# Patient Record
Sex: MALE | Race: WHITE | Employment: OTHER | URBAN - METROPOLITAN AREA
[De-identification: names, ages, dates, MRNs, and addresses within clinical notes are randomized per-mention and may not be internally consistent; named-entity substitution may affect disease eponyms.]

---

## 2019-09-09 NOTE — H&P (VIEW-ONLY)
Dalila Webb  
[birth year]  
20 y.o.  
 9/10/2019 UROLOGY ESTABLISHED PATIENT Encounter Diagnoses ICD-10-CM ICD-9-CM 1. Kidney stone N20.0 592.0 2. Hematuria, unspecified type R31.9 599.70 ASSESSMENT:  
1. Kidney stones 3 mm left ureteral stone at [address] and a column of tiny calcifications at left UVJ argest measuring 3 mm resulting in left hydronephrosis per CT 5/4/18. CT A/P WO Cont 7/21/19: small mid right ureteral calculus and a left proximal ureteral calculus with moderate bilateral hydroureteronephrosis. multiple large left renal pelvis calculi, largest 16 mm S/p cysto, bilateral RPG, and bilateral ureteral stent placement on 7/22/2019 S/p cysto, right ureteroscopy, right RPG, right ureteral stent removal, and fulguration of prostate bleeding on 9/10/19 Plan for left PCNL 2. H/o elevated PSA which has been as high as 6.1 on 10/19/15. Likely secondary to TRT. No longer on TRT and most recent PSA of 3.6 in 4/2019 
 
3. Hx of hypogonadism. T level 406 on 8/23/19 PLAN:  
· Urine sent for culture, will treat accordingly · Previously drawn PTH lab was cancelled, will get PTH in house after surgery · Now that right sided stone has been cleared, recommend left PCNL. · Reviewed indication, risks, and benefits of a PCNL to treat left sided stone burden. Hold ASA, fish oil, and other blood thinners 1 week prior. Surgery letter sent · RX for Ditropan 5mg provided to take PRN for bladder spasms. Reviewed R/B. Discussed urinary symptoms are likely secondary to JJ stent · To OR for left PCNL, surgery letter sent, plan on surgery 9/24. Discussion: 
We discussed the options for management of kidney/ureteral stones, including observation and PCNL. The risks and benefits of each option were discussed with patient.    
 
PCNL: risks and benefits of PCNL were outlined including infection, bleeding, blood transfusion, pain, pneumothorax, bowel injury, persistent urine leak, need for ancillary treatments, and global anesthesia risks including but not limited to CVA, MI, DVT, PE, pneumonia, and death. Chief Complaint Patient presents with  Kidney Stone  Post OP Follow Up HISTORY OF PRESENT ILLNESS:  Dalila Webb is a 69 y.o. male who is seen as a hospital follow up for kidney stones. Patient recently developed abdominal pain in 7/2019 and he presented to the [address] [place] [address]. A CT at that time revealed bilateral ureteral stones with bilateral moderate hydronephrosis and a significant left renal stone burden. Patient was transferred to [facility] for treatment. Pre-stent placement creatinine 2.2. Creatinine improved to 1.7 after the stents were placed. Patient then had a cysto, right ureteroscopy, right RPG, right ureteral stent removal, and fulguration of prostate bleeding on 9/10/19. No stones were identified or treated at that time as patient and patient likely passed the previously seen small mid right ureteral calculus. Today, patient is accompanied by his wife. Denies any gross hematuria since the procedure on 9/10/19. Patient reports he has been having significant suprapubic/bladder pain and urinary urgency/frequency since removing his catheter. He has been having trouble with sleeping 2/2 the pain and also reports some lower back pain. Prior history:  
Patient has a hx of ED and hypogonadism and is interested in treatment for both. T level 406 on 8/23/19. Pt was previously seen by Dr. Laney Hurley in 2015 for h/o elevated PSA which has been as high as 6.1 on 10/19/15. Most recent PSA of 3.6 in 4/2019. PSA elevation in the past likely due to being on TRT. He has been off TRT for several years. Past Medical History:  
Diagnosis Date  Chronic prostatitis  Kidney stone  Mixed hyperlipidemia  Nonspecific elevation of levels of transaminase or lactic acid dehydrogenase (LDH)  Polycythemia, secondary Past Surgical History:  
Procedure Laterality Date  HX OTHER SURGICAL  1998  
 right rotator cuff surgery  HX UROLOGICAL  08/28/2019 Cystoscopy, Right diagnostic ureteroscopy, R RPG, R ureteral stent removal, Fulguration of prostate bleeding. YULIA Teran [address]  
 right hydrocelectomy Social History Tobacco Use  Smoking status: Never Smoker  Smokeless tobacco: Never Used Substance Use Topics  Alcohol use: Yes Alcohol/week: 2.5 standard drinks Types: 3 Standard drinks or equivalent per week  Drug use: No  
 
 
No Known Allergies Family History Problem Relation Age of Onset  Hypertension Father  Stroke Father  Stroke Mother Current Outpatient Medications Medication Sig Dispense Refill  oxybutynin (DITROPAN) 5 mg tablet Take 1 Tab by mouth three (3) times daily as needed (bladder spasms). 40 Tab 0  
 atenolol (TENORMIN) 25 mg tablet   5  
 aspirin 81 mg chewable tablet Take 81 mg by mouth.  ascorbate calcium, vitamin C, 500 mg tab Take 1,000 mg by mouth.  cholecalciferol (VITAMIN D3) 1,000 unit tablet Take 2,000 Units by mouth.  fenofibrate nanocrystallized (TRICOR) 145 mg tablet Take  by mouth.  lisinopril-hydroCHLOROthiazide (PRINZIDE, ZESTORETIC) 20-12.5 mg per tablet Take  by mouth daily.  omega-3 acid ethyl esters (LOVAZA) 1 gram capsule Take 2 g by mouth two (2) times a day.  sildenafil citrate (VIAGRA) 100 mg tablet Take 100 mg by mouth as needed.  niacin ER (NIASPAN) 500 mg tablet Take  by mouth nightly.  rosuvastatin (CRESTOR) 10 mg tablet Take 10 mg by mouth nightly. Review of Systems Constitutional: Fever: No 
Skin: Rash: No 
HEENT: Hearing difficulty: No 
Eyes: Blurred vision: No 
Cardiovascular: Chest pain: No 
Respiratory: Shortness of breath: No 
Gastrointestinal: Nausea/vomiting: No 
Musculoskeletal: Back pain: No 
 Neurological: Weakness: No 
Psychological: Memory loss: No 
Comments/additional findings: PHYSICAL EXAMINATION:  
Visit Vitals /82 Ht 5' 8.5\" (1.74 m) Wt 173 lb (78.5 kg) BMI 25.92 kg/m² Constitutional: WDWN, No acute distress. HEENT: EOMs in tact Respiratory: No respiratory distress or difficulties CV:  No peripheral swelling noted Skin: Normal color and texture and No rashes or erythema noted Neuro/Psych:  No focal deficits EXT: no cyanosis or edema REVIEW OF LABS AND IMAGING:   
Results for orders placed or performed in visit on 09/10/19 AMB POC URINALYSIS DIP STICK AUTO W/O MICRO Result Value Ref Range Color (UA POC) Yellow Clarity (UA POC) Clear Glucose (UA POC) Negative Negative Bilirubin (UA POC) Negative Negative Ketones (UA POC) Negative Negative Specific gravity (UA POC) 1.020 1.001 - 1.035 Blood (UA POC) 2+ Negative pH (UA POC) 5.0 4.6 - 8.0 Protein (UA POC) 1+ Negative Urobilinogen (UA POC) 0.2 mg/dL 0.2 - 1 Nitrites (UA POC) Negative Negative Leukocyte esterase (UA POC) 1+ Negative PSA Trend PSA /TESTOSTERONE - [facility] PSA Latest Ref Rng & Units 0.0 - 3.6 ng/mL 4/1/2019 3.6  
9/27/2018 4.0  
4/25/2018 4.8  
10/12/2017 2.0  
4/20/2017 3.0  
10/18/2016 3.3  
4/1/2016 4.9 (A)  
10/19/2015 6.1 (A)  
4/3/2015 4.1 (A)  
4/17/2014 3.2 Testosterone PSA /TESTOSTERONE - [facility] Testosterone, total  
Latest Ref Rng & Units 348 - 1197 ng/dL  
8/23/2019 406  
4/20/2017 420 Radiology:  
 
CT A/P WO Cont 7/21/2019 Lung bases are clear. No pleural or pericardial fluid. Coronary atherosclerosis. 4 mm calculus right mid ureter with mild to moderate dilatation right proximal ureter and intrarenal collecting system. 17 mm calculus left proximal ureter, with multiple large left renal pelvis calculi, largest 16 mm. Additional smaller calculi left lower pole calyx.  Moderate left renal collecting system dilatation. The liver, gallbladder, spleen, pancreas, adrenals are all within normal limits are noncontrast exam. Subtle solid parenchymal lesions may be missed without IV contrast administration. Hollow viscus are normal. No free air or free fluid. Review of bone windows is unremarkable. Multilevel moderate disc space loss throughout lumbar spine, 2 mm L1 upon L2, L2 upon L3 retrolisthesis, and L4 upon L5 anterior listhesis. No definitive pars defect. Impression: 1. Small mid right ureteral calculus with moderate right hydroureteronephrosis. 2. Left proximal ureteral calculus with moderate left hydroureteronephrosis. Additional multiple prominent left renal calculi. Shant Gonzáles MD 
, Dept of Urology [employer] [facility] [place] [place] Pager #: [phone] Patient's BMI is out of the normal parameters. Information about BMI was given and patient was advised to follow-up with their PCP for further management. Medical documentation entered into the chart by kaveh Esparza scribe for Kenzie Polk MD on 9/10/2019.

## 2019-09-18 ENCOUNTER — HOSPITAL ENCOUNTER (OUTPATIENT)
Age: 74
Setting detail: OUTPATIENT SURGERY
DRG: 661 | End: 2019-09-18
Attending: UROLOGY | Admitting: UROLOGY
Payer: MEDICARE

## 2019-10-03 RX ORDER — CEFAZOLIN SODIUM 2 G/50ML
2 SOLUTION INTRAVENOUS
Status: CANCELLED | OUTPATIENT
Start: 2019-10-08 | End: 2019-10-09

## 2019-10-04 VITALS — BODY MASS INDEX: 26.96 KG/M2 | HEIGHT: 69 IN | WEIGHT: 182 LBS

## 2019-10-04 RX ORDER — ACETAMINOPHEN 500 MG
500 TABLET ORAL
COMMUNITY

## 2019-10-04 NOTE — PERIOP NOTES
PAT - SURGICAL PRE-ADMISSION INSTRUCTIONS    NAME:  Gail Aguirre                                                          TODAY'S DATE:  10/4/2019    SURGERY DATE:  10/8/2019                                  SURGERY ARRIVAL TIME:   TBV    1. Do NOT eat or drink anything, including candy or gum, after MIDNIGHT on 10/07/2019 , unless you have specific instructions from your Surgeon or Anesthesia Provider to do so. 2. No smoking on the day of surgery. 3. No alcohol 24 hours prior to the day of surgery. 4. No recreational drugs for one week prior to the day of surgery. 5. Leave all valuables, including money/purse, at home. 6. Remove all jewelry, nail polish, makeup (including mascara); no lotions, powders, deodorant, or perfume/cologne/after shave. 7. Glasses/Contact lenses and Dentures may be worn to the hospital.  They will be removed prior to surgery. 8. Call your doctor if symptoms of a cold or illness develop within 24 ours prior to surgery. 9. AN ADULT MUST DRIVE YOU HOME AFTER OUTPATIENT SURGERY. 10. If you are having an OUTPATIENT procedure, please make arrangements for a responsible adult to be with you for 24 hours after your surgery. 11. If you are admitted to the hospital, you will be assigned to a bed after surgery is complete. Normally a family member will not be able to see you until you are in your assigned bed. 15. Family is encouraged to accompany you to the hospital.  We ask visitors in the treatment area to be limited to ONE person at a time to ensure patient privacy. EXCEPTIONS WILL BE MADE AS NEEDED. 15. Children under 12 are discouraged from entering the treatment area and need to be supervised by an adult when in the waiting room.     Special Instructions:    STOP anticoagulants AT LEAST 1 WEEK PRIOR to your surgery or, follow other MD instructions:  Stopped Nsaids on Sunday    Patient Prep:    shower with anti-bacterial soap    These surgical instructions were reviewed with Krystina Garcia Saqib Pandya during the PAT phone call. Directions: On the morning of surgery, please go to the [facility]. Enter the building from the [facility] entrance, 1st floor (next to the Emergency Room entrance). Take the elevator to the 2nd floor. Sign in at the Registration Desk.     If you have any questions and/or concerns, please do not hesitate to call:  (Prior to the day of surgery)  [facility] [place] unit:  [phone]  (Day of surgery)  [facility] unit:  [phone]

## 2019-10-07 ENCOUNTER — ANESTHESIA EVENT (OUTPATIENT)
Dept: SURGERY | Age: 74
DRG: 661 | End: 2019-10-07
Payer: MEDICARE

## 2019-10-07 RX ORDER — FAMOTIDINE 20 MG/1
20 TABLET, FILM COATED ORAL ONCE
Status: CANCELLED | OUTPATIENT
Start: 2019-10-08 | End: 2019-10-08

## 2019-10-07 RX ORDER — SODIUM CHLORIDE, SODIUM LACTATE, POTASSIUM CHLORIDE, CALCIUM CHLORIDE 600; 310; 30; 20 MG/100ML; MG/100ML; MG/100ML; MG/100ML
50 INJECTION, SOLUTION INTRAVENOUS CONTINUOUS
Status: CANCELLED | OUTPATIENT
Start: 2019-10-07 | End: 2019-10-08

## 2019-10-07 RX ORDER — LIDOCAINE HYDROCHLORIDE 10 MG/ML
0.1 INJECTION, SOLUTION EPIDURAL; INFILTRATION; INTRACAUDAL; PERINEURAL AS NEEDED
Status: CANCELLED | OUTPATIENT
Start: 2019-10-07

## 2019-10-08 ENCOUNTER — HOSPITAL ENCOUNTER (INPATIENT)
Dept: CARDIAC CATH/INVASIVE PROCEDURES | Age: 74
LOS: 1 days | Discharge: HOME | DRG: 661 | End: 2019-10-09
Attending: UROLOGY | Admitting: UROLOGY
Payer: MEDICARE

## 2019-10-08 ENCOUNTER — ANESTHESIA (OUTPATIENT)
Dept: SURGERY | Age: 74
DRG: 661 | End: 2019-10-08
Payer: MEDICARE

## 2019-10-08 ENCOUNTER — APPOINTMENT (OUTPATIENT)
Dept: GENERAL RADIOLOGY | Age: 74
DRG: 661 | End: 2019-10-08
Attending: UROLOGY
Payer: MEDICARE

## 2019-10-08 DIAGNOSIS — N20.0 KIDNEY STONE: ICD-10-CM

## 2019-10-08 LAB
ANION GAP SERPL CALC-SCNC: 6 MMOL/L (ref 3–18)
APTT PPP: 28.1 SEC (ref 23–36.4)
BUN SERPL-MCNC: 26 MG/DL (ref 7–18)
BUN/CREAT SERPL: 18 (ref 12–20)
CALCIUM SERPL-MCNC: 9.8 MG/DL (ref 8.5–10.1)
CHLORIDE SERPL-SCNC: 107 MMOL/L (ref 100–111)
CO2 SERPL-SCNC: 26 MMOL/L (ref 21–32)
CREAT SERPL-MCNC: 1.42 MG/DL (ref 0.6–1.3)
ERYTHROCYTE [DISTWIDTH] IN BLOOD BY AUTOMATED COUNT: 15.5 % (ref 11.6–14.5)
GLUCOSE SERPL-MCNC: 90 MG/DL (ref 74–99)
HCT VFR BLD AUTO: 40.7 % (ref 36–48)
HGB BLD-MCNC: 13.6 G/DL (ref 13–16)
INR PPP: 1 (ref 0.8–1.2)
MCH RBC QN AUTO: 31.5 PG (ref 24–34)
MCHC RBC AUTO-ENTMCNC: 33.4 G/DL (ref 31–37)
MCV RBC AUTO: 94.2 FL (ref 74–97)
PLATELET # BLD AUTO: 258 K/UL (ref 135–420)
PMV BLD AUTO: 9.9 FL (ref 9.2–11.8)
POTASSIUM SERPL-SCNC: 3.9 MMOL/L (ref 3.5–5.5)
PROTHROMBIN TIME: 13 SEC (ref 11.5–15.2)
RBC # BLD AUTO: 4.32 M/UL (ref 4.7–5.5)
SODIUM SERPL-SCNC: 139 MMOL/L (ref 136–145)
WBC # BLD AUTO: 8.2 K/UL (ref 4.6–13.2)

## 2019-10-08 PROCEDURE — 74011000250 HC RX REV CODE- 250

## 2019-10-08 PROCEDURE — 74011250636 HC RX REV CODE- 250/636: Performed by: UROLOGY

## 2019-10-08 PROCEDURE — C1758 CATHETER, URETERAL: HCPCS | Performed by: UROLOGY

## 2019-10-08 PROCEDURE — 74011250636 HC RX REV CODE- 250/636

## 2019-10-08 PROCEDURE — C1769 GUIDE WIRE: HCPCS | Performed by: UROLOGY

## 2019-10-08 PROCEDURE — 0T9130Z DRAINAGE OF LEFT KIDNEY WITH DRAINAGE DEVICE, PERCUTANEOUS APPROACH: ICD-10-PCS | Performed by: UROLOGY

## 2019-10-08 PROCEDURE — 74011636320 HC RX REV CODE- 636/320: Performed by: UROLOGY

## 2019-10-08 PROCEDURE — 77030003666 HC NDL SPINAL BD -A

## 2019-10-08 PROCEDURE — 77030034850: Performed by: UROLOGY

## 2019-10-08 PROCEDURE — 85730 THROMBOPLASTIN TIME PARTIAL: CPT

## 2019-10-08 PROCEDURE — 76060000035 HC ANESTHESIA 2 TO 2.5 HR: Performed by: UROLOGY

## 2019-10-08 PROCEDURE — 77030032490 HC SLV COMPR SCD KNE COVD -B: Performed by: UROLOGY

## 2019-10-08 PROCEDURE — 77030026962 HC PRB RNL CYBERWND OCOA -F: Performed by: UROLOGY

## 2019-10-08 PROCEDURE — C1769 GUIDE WIRE: HCPCS

## 2019-10-08 PROCEDURE — 77030010545: Performed by: UROLOGY

## 2019-10-08 PROCEDURE — C1887 CATHETER, GUIDING: HCPCS

## 2019-10-08 PROCEDURE — BT121ZZ FLUOROSCOPY OF LEFT KIDNEY USING LOW OSMOLAR CONTRAST: ICD-10-PCS | Performed by: RADIOLOGY

## 2019-10-08 PROCEDURE — 77030018836 HC SOL IRR NACL ICUM -A: Performed by: UROLOGY

## 2019-10-08 PROCEDURE — 76210000006 HC OR PH I REC 0.5 TO 1 HR: Performed by: UROLOGY

## 2019-10-08 PROCEDURE — 74011000250 HC RX REV CODE- 250: Performed by: RADIOLOGY

## 2019-10-08 PROCEDURE — 82360 CALCULUS ASSAY QUANT: CPT

## 2019-10-08 PROCEDURE — 77030006974 HC BSKT URET RTVR BSC -C: Performed by: UROLOGY

## 2019-10-08 PROCEDURE — 77030005518 HC CATH URETH FOL 2W BARD -B: Performed by: UROLOGY

## 2019-10-08 PROCEDURE — 76010000171 HC OR TIME 2 TO 2.5 HR INTENSV-TIER 1: Performed by: UROLOGY

## 2019-10-08 PROCEDURE — 50390 DRAINAGE OF KIDNEY LESION: CPT

## 2019-10-08 PROCEDURE — 0TP98DZ REMOVAL OF INTRALUMINAL DEVICE FROM URETER, VIA NATURAL OR ARTIFICIAL OPENING ENDOSCOPIC: ICD-10-PCS | Performed by: UROLOGY

## 2019-10-08 PROCEDURE — 85610 PROTHROMBIN TIME: CPT

## 2019-10-08 PROCEDURE — 74011250636 HC RX REV CODE- 250/636: Performed by: STUDENT IN AN ORGANIZED HEALTH CARE EDUCATION/TRAINING PROGRAM

## 2019-10-08 PROCEDURE — 50433 PLMT NEPHROURETERAL CATHETER: CPT

## 2019-10-08 PROCEDURE — 74011000258 HC RX REV CODE- 258: Performed by: RADIOLOGY

## 2019-10-08 PROCEDURE — 0T9100Z DRAINAGE OF LEFT KIDNEY WITH DRAINAGE DEVICE, OPEN APPROACH: ICD-10-PCS | Performed by: UROLOGY

## 2019-10-08 PROCEDURE — 74011250637 HC RX REV CODE- 250/637: Performed by: STUDENT IN AN ORGANIZED HEALTH CARE EDUCATION/TRAINING PROGRAM

## 2019-10-08 PROCEDURE — 76010000131 HC OR TIME 2 TO 2.5 HR: Performed by: UROLOGY

## 2019-10-08 PROCEDURE — 74011250636 HC RX REV CODE- 250/636: Performed by: RADIOLOGY

## 2019-10-08 PROCEDURE — 80048 BASIC METABOLIC PNL TOTAL CA: CPT

## 2019-10-08 PROCEDURE — 77030013079 HC BLNKT BAIR HGGR 3M -A: Performed by: NURSE ANESTHETIST, CERTIFIED REGISTERED

## 2019-10-08 PROCEDURE — 77030008683 HC TU ET CUF COVD -A: Performed by: NURSE ANESTHETIST, CERTIFIED REGISTERED

## 2019-10-08 PROCEDURE — 0TC18ZZ EXTIRPATION OF MATTER FROM LEFT KIDNEY, VIA NATURAL OR ARTIFICIAL OPENING ENDOSCOPIC: ICD-10-PCS | Performed by: UROLOGY

## 2019-10-08 PROCEDURE — C1887 CATHETER, GUIDING: HCPCS | Performed by: UROLOGY

## 2019-10-08 PROCEDURE — 74011250637 HC RX REV CODE- 250/637: Performed by: NURSE ANESTHETIST, CERTIFIED REGISTERED

## 2019-10-08 PROCEDURE — 77030008477 HC STYL SATN SLP COVD -A: Performed by: NURSE ANESTHETIST, CERTIFIED REGISTERED

## 2019-10-08 PROCEDURE — C1894 INTRO/SHEATH, NON-LASER: HCPCS | Performed by: UROLOGY

## 2019-10-08 PROCEDURE — 99152 MOD SED SAME PHYS/QHP 5/>YRS: CPT

## 2019-10-08 PROCEDURE — C1726 CATH, BAL DIL, NON-VASCULAR: HCPCS | Performed by: UROLOGY

## 2019-10-08 PROCEDURE — 0TC13ZZ EXTIRPATION OF MATTER FROM LEFT KIDNEY, PERCUTANEOUS APPROACH: ICD-10-PCS | Performed by: UROLOGY

## 2019-10-08 PROCEDURE — 85027 COMPLETE CBC AUTOMATED: CPT

## 2019-10-08 PROCEDURE — 74011250636 HC RX REV CODE- 250/636: Performed by: NURSE ANESTHETIST, CERTIFIED REGISTERED

## 2019-10-08 PROCEDURE — 74011636320 HC RX REV CODE- 636/320: Performed by: RADIOLOGY

## 2019-10-08 PROCEDURE — 65270000029 HC RM PRIVATE

## 2019-10-08 RX ORDER — HYDROCHLOROTHIAZIDE 25 MG/1
12.5 TABLET ORAL DAILY
Status: DISCONTINUED | OUTPATIENT
Start: 2019-10-09 | End: 2019-10-09 | Stop reason: HOSPADM

## 2019-10-08 RX ORDER — FAMOTIDINE 20 MG/1
20 TABLET, FILM COATED ORAL ONCE
Status: COMPLETED | OUTPATIENT
Start: 2019-10-08 | End: 2019-10-08

## 2019-10-08 RX ORDER — ROSUVASTATIN CALCIUM 10 MG/1
10 TABLET, COATED ORAL
Status: DISCONTINUED | OUTPATIENT
Start: 2019-10-08 | End: 2019-10-09 | Stop reason: HOSPADM

## 2019-10-08 RX ORDER — SODIUM CHLORIDE 0.9 % (FLUSH) 0.9 %
5-40 SYRINGE (ML) INJECTION EVERY 8 HOURS
Status: DISCONTINUED | OUTPATIENT
Start: 2019-10-08 | End: 2019-10-08

## 2019-10-08 RX ORDER — HYDROMORPHONE HYDROCHLORIDE 2 MG/ML
0.5 INJECTION, SOLUTION INTRAMUSCULAR; INTRAVENOUS; SUBCUTANEOUS
Status: DISCONTINUED | OUTPATIENT
Start: 2019-10-08 | End: 2019-10-08 | Stop reason: HOSPADM

## 2019-10-08 RX ORDER — PROPOFOL 10 MG/ML
INJECTION, EMULSION INTRAVENOUS AS NEEDED
Status: DISCONTINUED | OUTPATIENT
Start: 2019-10-08 | End: 2019-10-08 | Stop reason: HOSPADM

## 2019-10-08 RX ORDER — SODIUM CHLORIDE, SODIUM LACTATE, POTASSIUM CHLORIDE, CALCIUM CHLORIDE 600; 310; 30; 20 MG/100ML; MG/100ML; MG/100ML; MG/100ML
50 INJECTION, SOLUTION INTRAVENOUS CONTINUOUS
Status: DISCONTINUED | OUTPATIENT
Start: 2019-10-08 | End: 2019-10-08 | Stop reason: HOSPADM

## 2019-10-08 RX ORDER — NEOSTIGMINE METHYLSULFATE 1 MG/ML
INJECTION, SOLUTION INTRAVENOUS AS NEEDED
Status: DISCONTINUED | OUTPATIENT
Start: 2019-10-08 | End: 2019-10-08 | Stop reason: HOSPADM

## 2019-10-08 RX ORDER — MORPHINE SULFATE 2 MG/ML
2 INJECTION, SOLUTION INTRAMUSCULAR; INTRAVENOUS
Status: DISCONTINUED | OUTPATIENT
Start: 2019-10-08 | End: 2019-10-09 | Stop reason: HOSPADM

## 2019-10-08 RX ORDER — GENTAMICIN SULFATE 40 MG/ML
INJECTION, SOLUTION INTRAMUSCULAR; INTRAVENOUS AS NEEDED
Status: DISCONTINUED | OUTPATIENT
Start: 2019-10-08 | End: 2019-10-08 | Stop reason: HOSPADM

## 2019-10-08 RX ORDER — ONDANSETRON 2 MG/ML
4 INJECTION INTRAMUSCULAR; INTRAVENOUS EVERY 6 HOURS
Status: DISCONTINUED | OUTPATIENT
Start: 2019-10-08 | End: 2019-10-09 | Stop reason: HOSPADM

## 2019-10-08 RX ORDER — DOCUSATE SODIUM 100 MG/1
100 CAPSULE, LIQUID FILLED ORAL 2 TIMES DAILY
Qty: 20 CAP | Refills: 0 | Status: SHIPPED | OUTPATIENT
Start: 2019-10-08 | End: 2019-10-18

## 2019-10-08 RX ORDER — LIDOCAINE HYDROCHLORIDE 10 MG/ML
1-30 INJECTION INFILTRATION; PERINEURAL AS NEEDED
Status: DISCONTINUED | OUTPATIENT
Start: 2019-10-08 | End: 2019-10-08 | Stop reason: HOSPADM

## 2019-10-08 RX ORDER — ONDANSETRON 2 MG/ML
4 INJECTION INTRAMUSCULAR; INTRAVENOUS ONCE
Status: DISCONTINUED | OUTPATIENT
Start: 2019-10-08 | End: 2019-10-08 | Stop reason: HOSPADM

## 2019-10-08 RX ORDER — SODIUM CHLORIDE 0.9 % (FLUSH) 0.9 %
5-40 SYRINGE (ML) INJECTION AS NEEDED
Status: DISCONTINUED | OUTPATIENT
Start: 2019-10-08 | End: 2019-10-08

## 2019-10-08 RX ORDER — HEPARIN SODIUM 5000 [USP'U]/ML
5000 INJECTION, SOLUTION INTRAVENOUS; SUBCUTANEOUS EVERY 8 HOURS
Status: DISCONTINUED | OUTPATIENT
Start: 2019-10-08 | End: 2019-10-08

## 2019-10-08 RX ORDER — SODIUM CHLORIDE 0.9 % (FLUSH) 0.9 %
5-40 SYRINGE (ML) INJECTION EVERY 8 HOURS
Status: DISCONTINUED | OUTPATIENT
Start: 2019-10-08 | End: 2019-10-08 | Stop reason: HOSPADM

## 2019-10-08 RX ORDER — NALOXONE HYDROCHLORIDE 0.4 MG/ML
0.4 INJECTION, SOLUTION INTRAMUSCULAR; INTRAVENOUS; SUBCUTANEOUS AS NEEDED
Status: DISCONTINUED | OUTPATIENT
Start: 2019-10-08 | End: 2019-10-09 | Stop reason: HOSPADM

## 2019-10-08 RX ORDER — LISINOPRIL 20 MG/1
20 TABLET ORAL DAILY
Status: DISCONTINUED | OUTPATIENT
Start: 2019-10-09 | End: 2019-10-09 | Stop reason: HOSPADM

## 2019-10-08 RX ORDER — OXYBUTYNIN CHLORIDE 5 MG/1
5 TABLET ORAL
Status: DISCONTINUED | OUTPATIENT
Start: 2019-10-08 | End: 2019-10-09 | Stop reason: HOSPADM

## 2019-10-08 RX ORDER — FENTANYL CITRATE 50 UG/ML
50 INJECTION, SOLUTION INTRAMUSCULAR; INTRAVENOUS AS NEEDED
Status: DISCONTINUED | OUTPATIENT
Start: 2019-10-08 | End: 2019-10-08 | Stop reason: HOSPADM

## 2019-10-08 RX ORDER — ONDANSETRON HYDROCHLORIDE 4 MG/2ML
INJECTION, SOLUTION INTRAMUSCULAR; INTRAVENOUS AS NEEDED
Status: DISCONTINUED | OUTPATIENT
Start: 2019-10-08 | End: 2019-10-08 | Stop reason: HOSPADM

## 2019-10-08 RX ORDER — FENTANYL CITRATE 50 UG/ML
INJECTION, SOLUTION INTRAMUSCULAR; INTRAVENOUS AS NEEDED
Status: DISCONTINUED | OUTPATIENT
Start: 2019-10-08 | End: 2019-10-08 | Stop reason: HOSPADM

## 2019-10-08 RX ORDER — CEFAZOLIN SODIUM 2 G/50ML
2 SOLUTION INTRAVENOUS EVERY 8 HOURS
Status: COMPLETED | OUTPATIENT
Start: 2019-10-08 | End: 2019-10-09

## 2019-10-08 RX ORDER — LIDOCAINE HYDROCHLORIDE 10 MG/ML
0.1 INJECTION, SOLUTION EPIDURAL; INFILTRATION; INTRACAUDAL; PERINEURAL AS NEEDED
Status: DISCONTINUED | OUTPATIENT
Start: 2019-10-08 | End: 2019-10-08 | Stop reason: HOSPADM

## 2019-10-08 RX ORDER — DEXAMETHASONE SODIUM PHOSPHATE 4 MG/ML
INJECTION, SOLUTION INTRA-ARTICULAR; INTRALESIONAL; INTRAMUSCULAR; INTRAVENOUS; SOFT TISSUE AS NEEDED
Status: DISCONTINUED | OUTPATIENT
Start: 2019-10-08 | End: 2019-10-08 | Stop reason: HOSPADM

## 2019-10-08 RX ORDER — SODIUM CHLORIDE 0.9 % (FLUSH) 0.9 %
5-40 SYRINGE (ML) INJECTION AS NEEDED
Status: DISCONTINUED | OUTPATIENT
Start: 2019-10-08 | End: 2019-10-08 | Stop reason: HOSPADM

## 2019-10-08 RX ORDER — FENTANYL CITRATE 50 UG/ML
25-200 INJECTION, SOLUTION INTRAMUSCULAR; INTRAVENOUS AS NEEDED
Status: DISCONTINUED | OUTPATIENT
Start: 2019-10-08 | End: 2019-10-08 | Stop reason: HOSPADM

## 2019-10-08 RX ORDER — GLYCOPYRROLATE 0.6MG/3ML
SYRINGE (ML) INTRAVENOUS AS NEEDED
Status: DISCONTINUED | OUTPATIENT
Start: 2019-10-08 | End: 2019-10-08 | Stop reason: HOSPADM

## 2019-10-08 RX ORDER — VECURONIUM BROMIDE FOR INJECTION 1 MG/ML
INJECTION, POWDER, LYOPHILIZED, FOR SOLUTION INTRAVENOUS AS NEEDED
Status: DISCONTINUED | OUTPATIENT
Start: 2019-10-08 | End: 2019-10-08 | Stop reason: HOSPADM

## 2019-10-08 RX ORDER — SODIUM CHLORIDE 9 MG/ML
50 INJECTION, SOLUTION INTRAVENOUS CONTINUOUS
Status: DISCONTINUED | OUTPATIENT
Start: 2019-10-08 | End: 2019-10-09 | Stop reason: HOSPADM

## 2019-10-08 RX ORDER — CEFAZOLIN SODIUM 2 G/50ML
2 SOLUTION INTRAVENOUS
Status: COMPLETED | OUTPATIENT
Start: 2019-10-08 | End: 2019-10-08

## 2019-10-08 RX ORDER — MIDAZOLAM HYDROCHLORIDE 1 MG/ML
.5-2 INJECTION, SOLUTION INTRAMUSCULAR; INTRAVENOUS AS NEEDED
Status: DISCONTINUED | OUTPATIENT
Start: 2019-10-08 | End: 2019-10-08 | Stop reason: HOSPADM

## 2019-10-08 RX ORDER — OXYCODONE AND ACETAMINOPHEN 5; 325 MG/1; MG/1
1 TABLET ORAL
Qty: 20 TAB | Refills: 0 | Status: SHIPPED | OUTPATIENT
Start: 2019-10-08 | End: 2019-10-11

## 2019-10-08 RX ORDER — NALOXONE HYDROCHLORIDE 0.4 MG/ML
0.4 INJECTION, SOLUTION INTRAMUSCULAR; INTRAVENOUS; SUBCUTANEOUS AS NEEDED
Status: DISCONTINUED | OUTPATIENT
Start: 2019-10-08 | End: 2019-10-08

## 2019-10-08 RX ORDER — SUCCINYLCHOLINE CHLORIDE 100 MG/5ML
SYRINGE (ML) INTRAVENOUS AS NEEDED
Status: DISCONTINUED | OUTPATIENT
Start: 2019-10-08 | End: 2019-10-08 | Stop reason: HOSPADM

## 2019-10-08 RX ORDER — DIPHENHYDRAMINE HCL 25 MG
25 CAPSULE ORAL
Status: DISCONTINUED | OUTPATIENT
Start: 2019-10-08 | End: 2019-10-09 | Stop reason: HOSPADM

## 2019-10-08 RX ORDER — HEPARIN SODIUM 200 [USP'U]/100ML
500 INJECTION, SOLUTION INTRAVENOUS
Status: DISCONTINUED | OUTPATIENT
Start: 2019-10-08 | End: 2019-10-08 | Stop reason: HOSPADM

## 2019-10-08 RX ORDER — LABETALOL HCL 20 MG/4 ML
20 SYRINGE (ML) INTRAVENOUS
Status: DISCONTINUED | OUTPATIENT
Start: 2019-10-08 | End: 2019-10-09 | Stop reason: HOSPADM

## 2019-10-08 RX ORDER — ACETAMINOPHEN 325 MG/1
325 TABLET ORAL
Status: DISCONTINUED | OUTPATIENT
Start: 2019-10-08 | End: 2019-10-09 | Stop reason: HOSPADM

## 2019-10-08 RX ORDER — OXYCODONE AND ACETAMINOPHEN 5; 325 MG/1; MG/1
1 TABLET ORAL
Status: DISCONTINUED | OUTPATIENT
Start: 2019-10-08 | End: 2019-10-09 | Stop reason: HOSPADM

## 2019-10-08 RX ORDER — LIDOCAINE HYDROCHLORIDE 20 MG/ML
INJECTION, SOLUTION EPIDURAL; INFILTRATION; INTRACAUDAL; PERINEURAL AS NEEDED
Status: DISCONTINUED | OUTPATIENT
Start: 2019-10-08 | End: 2019-10-08 | Stop reason: HOSPADM

## 2019-10-08 RX ADMIN — VECURONIUM BROMIDE FOR INJECTION 3 MG: 1 INJECTION, POWDER, LYOPHILIZED, FOR SOLUTION INTRAVENOUS at 13:15

## 2019-10-08 RX ADMIN — NEOSTIGMINE METHYLSULFATE 1 MG: 1 INJECTION, SOLUTION INTRAVENOUS at 15:18

## 2019-10-08 RX ADMIN — ROSUVASTATIN CALCIUM 10 MG: 10 TABLET, COATED ORAL at 23:16

## 2019-10-08 RX ADMIN — FENTANYL CITRATE 25 MCG: 50 INJECTION, SOLUTION INTRAMUSCULAR; INTRAVENOUS at 14:58

## 2019-10-08 RX ADMIN — VECURONIUM BROMIDE FOR INJECTION 2 MG: 1 INJECTION, POWDER, LYOPHILIZED, FOR SOLUTION INTRAVENOUS at 13:30

## 2019-10-08 RX ADMIN — SODIUM CHLORIDE 50 ML/HR: 900 INJECTION, SOLUTION INTRAVENOUS at 16:25

## 2019-10-08 RX ADMIN — FENTANYL CITRATE 50 MCG: 50 INJECTION, SOLUTION INTRAMUSCULAR; INTRAVENOUS at 13:06

## 2019-10-08 RX ADMIN — FAMOTIDINE 20 MG: 20 TABLET ORAL at 09:07

## 2019-10-08 RX ADMIN — ONDANSETRON 4 MG: 2 INJECTION INTRAMUSCULAR; INTRAVENOUS at 17:56

## 2019-10-08 RX ADMIN — MIDAZOLAM 1 MG: 1 INJECTION INTRAMUSCULAR; INTRAVENOUS at 11:00

## 2019-10-08 RX ADMIN — HEPARIN SODIUM 1000 UNITS: 200 INJECTION, SOLUTION INTRAVENOUS at 10:59

## 2019-10-08 RX ADMIN — FENTANYL CITRATE 50 MCG: 50 INJECTION INTRAMUSCULAR; INTRAVENOUS at 10:54

## 2019-10-08 RX ADMIN — NEOSTIGMINE METHYLSULFATE 3 MG: 1 INJECTION, SOLUTION INTRAVENOUS at 15:13

## 2019-10-08 RX ADMIN — ONDANSETRON 4 MG: 2 INJECTION INTRAMUSCULAR; INTRAVENOUS at 23:16

## 2019-10-08 RX ADMIN — LIDOCAINE HYDROCHLORIDE 4 ML: 10 INJECTION, SOLUTION INFILTRATION; PERINEURAL at 11:06

## 2019-10-08 RX ADMIN — LIDOCAINE HYDROCHLORIDE 100 MG: 20 INJECTION, SOLUTION EPIDURAL; INFILTRATION; INTRACAUDAL; PERINEURAL at 13:06

## 2019-10-08 RX ADMIN — FENTANYL CITRATE 25 MCG: 50 INJECTION, SOLUTION INTRAMUSCULAR; INTRAVENOUS at 15:15

## 2019-10-08 RX ADMIN — VECURONIUM BROMIDE FOR INJECTION 1 MG: 1 INJECTION, POWDER, LYOPHILIZED, FOR SOLUTION INTRAVENOUS at 15:00

## 2019-10-08 RX ADMIN — FENTANYL CITRATE 50 MCG: 50 INJECTION INTRAMUSCULAR; INTRAVENOUS at 10:59

## 2019-10-08 RX ADMIN — CEFAZOLIN 2 G: 10 INJECTION, POWDER, FOR SOLUTION INTRAVENOUS at 20:57

## 2019-10-08 RX ADMIN — OXYBUTYNIN CHLORIDE 5 MG: 5 TABLET ORAL at 16:26

## 2019-10-08 RX ADMIN — MIDAZOLAM 1 MG: 1 INJECTION INTRAMUSCULAR; INTRAVENOUS at 10:54

## 2019-10-08 RX ADMIN — FENTANYL CITRATE 50 MCG: 50 INJECTION, SOLUTION INTRAMUSCULAR; INTRAVENOUS at 13:45

## 2019-10-08 RX ADMIN — OXYCODONE HYDROCHLORIDE AND ACETAMINOPHEN 1 TABLET: 5; 325 TABLET ORAL at 16:26

## 2019-10-08 RX ADMIN — IOPAMIDOL 6 ML: 612 INJECTION, SOLUTION INTRAVENOUS at 11:06

## 2019-10-08 RX ADMIN — Medication 140 MG: at 13:08

## 2019-10-08 RX ADMIN — ONDANSETRON HYDROCHLORIDE 4 MG: 4 INJECTION, SOLUTION INTRAMUSCULAR; INTRAVENOUS at 15:02

## 2019-10-08 RX ADMIN — CEFTRIAXONE 2 G: 2 INJECTION, POWDER, FOR SOLUTION INTRAMUSCULAR; INTRAVENOUS at 10:53

## 2019-10-08 RX ADMIN — SODIUM CHLORIDE, SODIUM LACTATE, POTASSIUM CHLORIDE, AND CALCIUM CHLORIDE 50 ML/HR: 600; 310; 30; 20 INJECTION, SOLUTION INTRAVENOUS at 09:07

## 2019-10-08 RX ADMIN — FENTANYL CITRATE 50 MCG: 50 INJECTION, SOLUTION INTRAMUSCULAR; INTRAVENOUS at 15:25

## 2019-10-08 RX ADMIN — VECURONIUM BROMIDE FOR INJECTION 1 MG: 1 INJECTION, POWDER, LYOPHILIZED, FOR SOLUTION INTRAVENOUS at 14:15

## 2019-10-08 RX ADMIN — CEFAZOLIN SODIUM 2 G: 2 SOLUTION INTRAVENOUS at 13:10

## 2019-10-08 RX ADMIN — PROPOFOL 170 MG: 10 INJECTION, EMULSION INTRAVENOUS at 13:08

## 2019-10-08 RX ADMIN — Medication 0.6 MG: at 15:13

## 2019-10-08 RX ADMIN — DEXAMETHASONE SODIUM PHOSPHATE 4 MG: 4 INJECTION, SOLUTION INTRA-ARTICULAR; INTRALESIONAL; INTRAMUSCULAR; INTRAVENOUS; SOFT TISSUE at 13:21

## 2019-10-08 NOTE — H&P
Urology Preop History and Physical Exam    10/8/2019, 11:51 AM  Nina Owen is a 69 y.o. male  1945  MRN: [id number]    PLANNED PROCEDURE:   Left PCNL     SUBJECTIVE:     CC: nephrolithiasis     HPI:  Nina Owen is a 69 y.o. Male who presents for above procedure. Denies any interval changes since last office visit. No gross hematuria or N/V. Past Medical History:   Diagnosis Date    Arthritis     Chronic prostatitis     Hypertension     Kidney stone     Mixed hyperlipidemia     Nonspecific elevation of levels of transaminase or lactic acid dehydrogenase (LDH)        Past Surgical History:   Procedure Laterality Date    HX OTHER SURGICAL  1998    right rotator cuff surgery    HX UROLOGICAL  08/28/2019    Cystoscopy, Right diagnostic ureteroscopy, R RPG, R ureteral stent removal, Fulguration of prostate bleeding. 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/    Tobacco Use    Smoking status: Never Smoker    Smokeless tobacco: Never Used   Substance and Sexual Activity    Alcohol use:  Yes     Alcohol/week: 10.0 standard drinks     Types: 3 Standard drinks or equivalent, 7 Shots of liquor per week     Comment: Social    Drug use: No       Current Facility-Administered Medications   Medication Dose Route Frequency Provider Last Rate Last Dose    lactated Ringers infusion  50 mL/hr IntraVENous CONTINUOUS Sol Angelucci, CRNA 50 mL/hr at 10/08/19 0907 50 mL/hr at 10/08/19 0907    lidocaine (PF) (XYLOCAINE) 10 mg/mL (1 %) injection 0.1 mL  0.1 mL SubCUTAneous PRN Bibiana Quispe CRNA        ceFAZolin (ANCEF) 2g IVPB in 50 mL D5W  2 g IntraVENous ON CALL TO OR Anders Leavitt MD        cefTRIAXone (ROCEPHIN) 2 g in 0.9% sodium chloride (MBP/ADV) 50 mL MBP  2 g IntraVENous Q24H Salome Stubbs  mL/hr at 10/08/19 1053 2 g at 10/08/19 1053    fentaNYL citrate (PF) injection  mcg   mcg IntraVENous PRN Salome Stubbs MD   50 mcg at 10/08/19 1059    lidocaine (XYLOCAINE) 10 mg/mL (1 %) injection 1-30 mL  1-30 mL IntraDERMal PRN Salome Stubbs MD   4 mL at 10/08/19 1106    midazolam (VERSED) injection 0.5-2 mg  0.5-2 mg IntraVENous PRN Salome Stubbs MD   1 mg at 10/08/19 1100    heparin (PF) 2 units/ml in NS infusion 1,000 Units  500 mL Irrigation Multiple Salome Stubbs MD   1,000 Units at 10/08/19 1059       No Known Allergies      Review of Systems  Constitutional: Fever:   Skin: Rash: HEENT: Hearing difficulty:   Eyes: Blurred vision:   Cardiovascular: Chest pain:   Respiratory: Shortness of breath:   Gastrointestinal: Nausea/vomiting:   Musculoskeletal: Back pain:   Neurological: Weakness:   Psychological: Memory loss:   Comments/additional findings:         OBJECTIVE:     Vitals:    10/08/19 0844 10/08/19 1114 10/08/19 1121 10/08/19 1147   BP: 168/79 (!) 151/99 145/86 130/80   Pulse: 87 80 84 79   Resp: 18 16 18 18   Temp: 98 °F (36.7 °C)      SpO2: 100% 95% 99% 99%   Weight: 183 lb (83 kg)      Height: 5' 9\" (1.753 m)          Gen: Patient is in NAD   Pulm: Equal respiratory effort bilaterally, CTAB  CV: regular rate and rhythm  Abd: soft, nontender, non-distended.  Left PCN access in place  Ext: No clubbing, cyanosis or edema   Neuro: Grossly intact   Skin: warm and dry       LAB AND RADIOLOGY:     Results for orders placed or performed during the hospital encounter of 36/58/41   METABOLIC PANEL, BASIC   Result Value Ref Range    Sodium 139 136 - 145 mmol/L    Potassium 3.9 3.5 - 5.5 mmol/L    Chloride 107 100 - 111 mmol/L    CO2 26 21 - 32 mmol/L    Anion gap 6 3.0 - 18 mmol/L    Glucose 90 74 - 99 mg/dL    BUN 26 (H) 7.0 - 18 MG/DL    Creatinine 1.42 (H) 0.6 - 1.3 MG/DL    BUN/Creatinine ratio 18 12 - 20      GFR est AA 59 (L) >60 ml/min/1.73m2    GFR est non-AA 49 (L) >60 ml/min/1.73m2    Calcium 9.8 8.5 - 10.1 MG/DL   CBC W/O DIFF   Result Value Ref Range    WBC 8.2 4.6 - 13.2 K/uL    RBC 4.32 (L) 4.70 - 5.50 M/uL    HGB 13.6 13.0 - 16.0 g/dL    HCT 40.7 36.0 - 48.0 %    MCV 94.2 74.0 - 97.0 FL    MCH 31.5 24.0 - 34.0 PG    MCHC 33.4 31.0 - 37.0 g/dL    RDW 15.5 (H) 11.6 - 14.5 %    PLATELET 121 293 - 665 K/uL    MPV 9.9 9.2 - 11.8 FL   PROTHROMBIN TIME + INR   Result Value Ref Range    Prothrombin time 13.0 11.5 - 15.2 sec    INR 1.0 0.8 - 1.2     PTT   Result Value Ref Range    aPTT 28.1 23.0 - 36.4 SEC       IMAGING: reviewed today  CT A/P WO Cont 7/21/2019    Lung bases are clear. No pleural or pericardial fluid. Coronary atherosclerosis. 4 mm calculus right mid ureter with mild to moderate dilatation right proximal ureter and intrarenal collecting system. 17 mm calculus left proximal ureter, with multiple large left renal pelvis calculi, largest 16 mm. Additional smaller calculi left lower pole calyx. Moderate left renal collecting system dilatation. The liver, gallbladder, spleen, pancreas, adrenals are all within normal limits are noncontrast exam. Subtle solid parenchymal lesions may be missed without IV contrast administration. Hollow viscus are normal. No free air or free fluid. Review of bone windows is unremarkable. Multilevel moderate disc space loss throughout lumbar spine, 2 mm L1 upon L2, L2 upon L3 retrolisthesis, and L4 upon L5 anterior listhesis. No definitive pars defect.     Impression:  1. Small mid right ureteral calculus with moderate right hydroureteronephrosis. 2. Left proximal ureteral calculus with moderate left hydroureteronephrosis.  Additional multiple prominent left renal calculi. ASSESSMENT:   1. Kidney stones              3 mm left ureteral stone at [address] and a column of tiny calcifications at left UVJ argest measuring 3 mm resulting in left hydronephrosis per CT 5/4/18. CT A/P WO Cont 7/21/19: small mid right ureteral calculus and a left proximal ureteral calculus with moderate bilateral hydroureteronephrosis. multiple large left renal pelvis calculi, largest 16 mm              S/p cysto, bilateral RPG, and bilateral ureteral stent placement on 7/22/2019              S/p cysto, right ureteroscopy, right RPG, right ureteral stent removal, and fulguration of prostate bleeding on 9/10/19              Plan for left PCNL      2. H/o elevated PSA which has been as high as 6.1 on 10/19/15. Likely secondary to TRT. No longer on TRT and most recent PSA of 3.6 in 4/2019    3. Hx of hypogonadism.  T level 406 on 8/23/19    PLAN:   Left lower pole access obtained by IR  UCx 9/10 showed 10K cfu   Rocephin on call to give  Anticipate overnight admission post-op     Signed By: Pete South MD     October 8, 2019

## 2019-10-08 NOTE — ANESTHESIA PREPROCEDURE EVALUATION
Relevant Problems   No relevant active problems       Anesthetic History   No history of anesthetic complications            Review of Systems / Medical History  Patient summary reviewed, nursing notes reviewed and pertinent labs reviewed    Pulmonary  Within defined limits                 Neuro/Psych   Within defined limits           Cardiovascular    Hypertension                   GI/Hepatic/Renal  Within defined limits              Endo/Other        Arthritis     Other Findings              Physical Exam    Airway  Mallampati: III  TM Distance: 4 - 6 cm  Neck ROM: normal range of motion   Mouth opening: Normal     Cardiovascular  Regular rate and rhythm,  S1 and S2 normal,  no murmur, click, rub, or gallop             Dental    Dentition: Poor dentition     Pulmonary  Breath sounds clear to auscultation               Abdominal  GI exam deferred       Other Findings            Anesthetic Plan    ASA: 2  Anesthesia type: general          Induction: Intravenous  Anesthetic plan and risks discussed with: Patient

## 2019-10-08 NOTE — PERIOP NOTES
Received patient from [place] from . Patient is resting comfortably with wife at bedside. Dressing is intact has a small droplet of blood on gauze, not actively bleeding. Patient states he is no pain at this time. Patient awaiting surgery.    Visit Vitals  /80 (BP 1 Location: Right arm, BP Patient Position: At rest)   Pulse 79   Temp 98 °F (36.7 °C)   Resp 18   Ht 5' 9\" (1.753 m)   Wt 83 kg (183 lb)   SpO2 99%   BMI 27.02 kg/m²

## 2019-10-08 NOTE — ANESTHESIA POSTPROCEDURE EVALUATION
Procedure(s):  left PERCUTANEOUS NEPHROLITHOTOMY , laser lithotripsy. general    Anesthesia Post Evaluation      Multimodal analgesia: multimodal analgesia used between 6 hours prior to anesthesia start to PACU discharge  Patient location during evaluation: bedside  Patient participation: complete - patient participated  Level of consciousness: awake  Pain management: adequate  Airway patency: patent  Anesthetic complications: no  Cardiovascular status: stable  Respiratory status: acceptable  Hydration status: acceptable  Post anesthesia nausea and vomiting:  controlled      Vitals Value Taken Time   /81 10/8/2019  3:51 PM   Temp 36.2 °C (97.1 °F) 10/8/2019  3:27 PM   Pulse 80 10/8/2019  3:55 PM   Resp 15 10/8/2019  3:55 PM   SpO2 100 % 10/8/2019  3:55 PM   Vitals shown include unvalidated device data.

## 2019-10-08 NOTE — PERIOP NOTES
TRANSFER - OUT REPORT:    Verbal report given to martina burroughs(name) on Irena Dudley  being transferred to [address] [place] [address]) for routine post - op       Report consisted of patients Situation, Background, Assessment and   Recommendations(SBAR). Information from the following report(s) SBAR, OR Summary, Procedure Summary, Intake/Output and MAR was reviewed with the receiving nurse. Lines:   Peripheral IV 10/08/19 Anterior; Left Forearm (Active)   Site Assessment Clean, dry, & intact 10/8/2019  3:27 PM   Phlebitis Assessment 0 10/8/2019  3:27 PM   Infiltration Assessment 0 10/8/2019  3:27 PM   Dressing Status Clean, dry, & intact 10/8/2019  3:27 PM   Dressing Type Transparent;Tape 10/8/2019  3:27 PM   Hub Color/Line Status Infusing;Blue 10/8/2019  3:27 PM        Opportunity for questions and clarification was provided.       Patient transported with:   [employer]

## 2019-10-08 NOTE — PROCEDURES
Vascular & Interventional Radiology Brief Procedure Note    Interventional Radiologist: Christelle Lai MD    Pre-operative Diagnosis:  L renal calculi    Post-operative Diagnosis: Same as pre-op dx    Procedure(s) Performed:  L PCNU    Anesthesia:  Local and Moderate Sedation    Findings:  Uncomplicated L PCNU 4F catheter placement via lower pole access    Complications: None    Estimated Blood Loss:  minimal    Tubes and Drains: None    Specimens: None    Condition: Good     Christelle Lai MD  [facility]  Vascular & Interventional Radiology  10/8/2019

## 2019-10-08 NOTE — BRIEF OP NOTE
BRIEF OPERATIVE NOTE    Date of Procedure: 10/8/2019   Preoperative Diagnosis: kidney stones  n20.0  Postoperative Diagnosis: kidney stones  n20.0    Procedure(s):  left PERCUTANEOUS NEPHROLITHOTOMY , laser lithotripsy  Surgeon(s) and Role:     Silverio Wheatley MD - Primary         Surgical Assistant: Perry Rueda PGY3    Surgical Staff:  Circ-1: Jose Francisco Harris RN  Scrub Tech-1: Thurmon Angelucci  Surg Asst-1: [facility] Sat  Event Time In Time Out   Incision Start  1:30 PM    Incision Close  3:18 PM      Anesthesia: General   Estimated Blood Loss: 50 cc  Specimens:   ID Type Source Tests Collected by Time Destination   1 : LEFT KIDNEY STONES FOR ANALYSIS  Kidney, Left  Louie Farley MD 10/8/2019  2:38 PM Pathology      Findings: multiple large left renal pelvis stones, no significant stones in kidney or ureter at the end of the case.      Complications: none  Implants: * No implants in log *  Drains: 16 Fr gray, left 22 Fr counciltip PCN and 5 FR ureteral cath    Milan Buenrostro MD  , Dept of Urology  [facility]  [facility] [place], [place]  Pager #: [phone]

## 2019-10-08 NOTE — PROGRESS NOTES
1800  Urine less turbid than upon admission, pt states minimal pain level presently. Spouse at bedside. 1915  Bedside and Verbal shift change report given to Armond Dodd RN (oncoming nurse) by Doyle Thapa RN (offgoing nurse). Report included the following information SBAR, Kardex, OR Summary, Intake/Output, MAR, Accordion, Recent Results and Quality Measures.

## 2019-10-08 NOTE — H&P
The patient is an appropriate candidate to undergo left PCNU. Patient assessed immediately prior to induction. Anesthesia plan as follows:   Conscious Sedation. Planned agent(s):  fentanyl and versed    ASA Score:  ASA 2 - Mild systemic disease    History and Physical update:  H&P was reviewed and the patient was examined. No changes have occurred in the patient's condition since the H&P was completed.     Coretta Llamas MD  Vascular & Interventional Radiology  [address] [place] [address] [facility]  10/8/2019

## 2019-10-08 NOTE — PROGRESS NOTES
TRANSFER - OUT REPORT:    Verbal report given to Siri Shepard RN on Daniel Allison being transferred to [facility] POD 2(unit) for routine progression of care       Report consisted of patient's Situation, Background, Assessment and   Recommendations(SBAR). Information from the following report(s) SBAR was reviewed with the receiving nurse. Opportunity for questions and clarification was provided.       Patient transported with:   [email or web address]

## 2019-10-08 NOTE — PERIOP NOTES
Pre-Op Summary    Pt arrived via car with family/friend and is oriented to time, place, person and situation. Patient with steady gait with none assistive devices. Visit Vitals  /79 (BP 1 Location: Left arm, BP Patient Position: At rest)   Pulse 87   Temp 98 °F (36.7 °C)   Resp 18   Ht 5' 9\" (1.753 m)   Wt 83 kg (183 lb)   SpO2 100%   BMI 27.02 kg/m²       Peripheral IV located on Left forearm. Patients belongings are located with wife. .    [facility] and [facility] contact number is: [phone]. They will be in the waiting room. They are able to receive medication information. They will be their ride home.

## 2019-10-08 NOTE — INTERVAL H&P NOTE
H&P Update:  Delano Fiore was seen and examined. History and physical has been reviewed. The patient has been examined. There have been no significant clinical changes since the completion of the originally dated History and Physical.  Patient identified by surgeon; surgical site was confirmed by patient and surgeon.

## 2019-10-09 ENCOUNTER — APPOINTMENT (OUTPATIENT)
Dept: GENERAL RADIOLOGY | Age: 74
DRG: 661 | End: 2019-10-09
Attending: STUDENT IN AN ORGANIZED HEALTH CARE EDUCATION/TRAINING PROGRAM
Payer: MEDICARE

## 2019-10-09 VITALS
TEMPERATURE: 98.1 F | BODY MASS INDEX: 27.11 KG/M2 | OXYGEN SATURATION: 99 % | HEART RATE: 95 BPM | RESPIRATION RATE: 16 BRPM | WEIGHT: 183 LBS | DIASTOLIC BLOOD PRESSURE: 76 MMHG | SYSTOLIC BLOOD PRESSURE: 134 MMHG | HEIGHT: 69 IN

## 2019-10-09 LAB
ANION GAP SERPL CALC-SCNC: 8 MMOL/L (ref 3–18)
BASOPHILS # BLD: 0 K/UL (ref 0–0.1)
BASOPHILS NFR BLD: 0 % (ref 0–2)
BUN SERPL-MCNC: 26 MG/DL (ref 7–18)
BUN/CREAT SERPL: 16 (ref 12–20)
CALCIUM SERPL-MCNC: 8.9 MG/DL (ref 8.5–10.1)
CALCIUM SERPL-MCNC: 8.9 MG/DL (ref 8.5–10.1)
CHLORIDE SERPL-SCNC: 104 MMOL/L (ref 100–111)
CO2 SERPL-SCNC: 25 MMOL/L (ref 21–32)
CREAT SERPL-MCNC: 1.64 MG/DL (ref 0.6–1.3)
DIFFERENTIAL METHOD BLD: ABNORMAL
EOSINOPHIL # BLD: 0 K/UL (ref 0–0.4)
EOSINOPHIL NFR BLD: 0 % (ref 0–5)
ERYTHROCYTE [DISTWIDTH] IN BLOOD BY AUTOMATED COUNT: 15.3 % (ref 11.6–14.5)
GLUCOSE SERPL-MCNC: 94 MG/DL (ref 74–99)
HCT VFR BLD AUTO: 35.7 % (ref 36–48)
HGB BLD-MCNC: 11.8 G/DL (ref 13–16)
LYMPHOCYTES # BLD: 1.4 K/UL (ref 0.9–3.6)
LYMPHOCYTES NFR BLD: 9 % (ref 21–52)
MCH RBC QN AUTO: 30.9 PG (ref 24–34)
MCHC RBC AUTO-ENTMCNC: 33.1 G/DL (ref 31–37)
MCV RBC AUTO: 93.5 FL (ref 74–97)
MONOCYTES # BLD: 1.1 K/UL (ref 0.05–1.2)
MONOCYTES NFR BLD: 7 % (ref 3–10)
NEUTS SEG # BLD: 13 K/UL (ref 1.8–8)
NEUTS SEG NFR BLD: 84 % (ref 40–73)
PLATELET # BLD AUTO: 233 K/UL (ref 135–420)
PMV BLD AUTO: 10 FL (ref 9.2–11.8)
POTASSIUM SERPL-SCNC: 4.3 MMOL/L (ref 3.5–5.5)
PTH-INTACT SERPL-MCNC: 27 PG/ML (ref 18.4–88)
RBC # BLD AUTO: 3.82 M/UL (ref 4.7–5.5)
SODIUM SERPL-SCNC: 137 MMOL/L (ref 136–145)
WBC # BLD AUTO: 15.5 K/UL (ref 4.6–13.2)

## 2019-10-09 PROCEDURE — 85025 COMPLETE CBC W/AUTO DIFF WBC: CPT

## 2019-10-09 PROCEDURE — 80048 BASIC METABOLIC PNL TOTAL CA: CPT

## 2019-10-09 PROCEDURE — 74011250636 HC RX REV CODE- 250/636: Performed by: UROLOGY

## 2019-10-09 PROCEDURE — 74011250637 HC RX REV CODE- 250/637: Performed by: STUDENT IN AN ORGANIZED HEALTH CARE EDUCATION/TRAINING PROGRAM

## 2019-10-09 PROCEDURE — 36415 COLL VENOUS BLD VENIPUNCTURE: CPT

## 2019-10-09 PROCEDURE — 83970 ASSAY OF PARATHORMONE: CPT

## 2019-10-09 PROCEDURE — 74011250636 HC RX REV CODE- 250/636: Performed by: STUDENT IN AN ORGANIZED HEALTH CARE EDUCATION/TRAINING PROGRAM

## 2019-10-09 PROCEDURE — 74018 RADEX ABDOMEN 1 VIEW: CPT

## 2019-10-09 RX ADMIN — ONDANSETRON 4 MG: 2 INJECTION INTRAMUSCULAR; INTRAVENOUS at 06:18

## 2019-10-09 RX ADMIN — CEFAZOLIN 2 G: 10 INJECTION, POWDER, FOR SOLUTION INTRAVENOUS at 13:04

## 2019-10-09 RX ADMIN — OXYCODONE HYDROCHLORIDE AND ACETAMINOPHEN 1 TABLET: 5; 325 TABLET ORAL at 04:22

## 2019-10-09 RX ADMIN — LISINOPRIL 20 MG: 20 TABLET ORAL at 09:14

## 2019-10-09 RX ADMIN — CEFAZOLIN 2 G: 10 INJECTION, POWDER, FOR SOLUTION INTRAVENOUS at 04:17

## 2019-10-09 RX ADMIN — OXYBUTYNIN CHLORIDE 5 MG: 5 TABLET ORAL at 13:10

## 2019-10-09 RX ADMIN — OXYCODONE HYDROCHLORIDE AND ACETAMINOPHEN 1 TABLET: 5; 325 TABLET ORAL at 09:14

## 2019-10-09 RX ADMIN — OXYCODONE HYDROCHLORIDE AND ACETAMINOPHEN 1 TABLET: 5; 325 TABLET ORAL at 13:10

## 2019-10-09 RX ADMIN — OXYBUTYNIN CHLORIDE 5 MG: 5 TABLET ORAL at 04:22

## 2019-10-09 RX ADMIN — ONDANSETRON 4 MG: 2 INJECTION INTRAMUSCULAR; INTRAVENOUS at 13:05

## 2019-10-09 NOTE — OP NOTES
[facility]  OPERATIVE REPORT    Name:  Mary Kate Flynn  MR#:   [id number]  :  1945  ACCOUNT #:  [de-identified]  DATE OF SERVICE:  10/08/2019    PREOPERATIVE DIAGNOSIS:  Left renal calculi. POSTOPERATIVE DIAGNOSIS:  Left renal calculi. PROCEDURES PERFORMED:  1. Left percutaneous nephrolithotomy (greater than 2 cm). 2.  Dilation of renal tract percutaneously under live fluoroscopy. 3.  Left ureteral stent removal.  4.  Left antegrade nephrostogram.  5.  Left antegrade ureteroscopy with stone basket extraction. 6.  Fluoroscopy time less than 30 minutes with interpretation. SURGEON:  Raghavendra Linares MD    ASSISTANT:  Alice Li, PGY-3    ANESTHESIA:  General.    COMPLICATIONS:  None. SPECIMENS REMOVED:  Left renal calculi for stone analysis. IMPLANTS/GRAFTS:  None. ESTIMATED BLOOD LOSS:  50 mL. IV FLUIDS:  900 mL of crystalloid. DRAINS:  1.  A 16-South African Hare catheter. 2.  Left 22-[place]-tip percutaneous nephrostomy tube. 3.  Left 5-[place] African ureteral catheter. CONDITION:  Stable. INDICATION FOR PROCEDURE:  The patient is a 81-year-old male who presented to me with kidney stones. He had a CT scan done in 2019 at [facility] which showed a small right ureteral calculus and multiple large left renal calculi. He had acute kidney injury at that time. He had a stent placed and had undergone right ureteroscopy. At the time of right ureteroscopy, it appeared that the small stone in the ureter had passed and the stent was removed. He was then being set up for a staged left percutaneous nephrolithotomy. Risks, benefits, and alternatives of surgery were fully discussed with the patient, and he agreed and consented to proceed. On the morning of surgery, he underwent access with Interventional Radiology. A left lower pole access was obtained, and the access films and fluoroscopic films were reviewed prior to the surgery.   His preoperative urine culture showed less than 10,000 mixed culture. DESCRIPTION OF PROCEDURE:  After successful nephroureteral catheter had been placed by Interventional Radiology, he was then brought up to the operating suite. Anesthesia was induced and he was intubated. A Hare catheter was placed sterilely while he was in supine position on stretcher. He was then placed into a prone position. All pressure points were padded, and his arms were placed in the swimmer's position. He had gel rolls underneath the torso, and eggcrate was placed underneath the axilla. The back was then prepped and draped in a sterile fashion. He had received a dose of IV antibiotics prior to procedure start time. He also had SCD cycling prior to induction of anesthesia. At this point, a 0.035 sensor wire was passed through the 4-[place] nephroureteral catheter down into the bladder which we could see under fluoroscopy. A 1-cm incision was made alongside the wire. The lumbar dorsal fascia was pierced with a fascia incising knife. An 8/10 dilator was then advanced into the proximal ureter. A second sensor wire was placed down into the bladder. We then removed the 8/10 dilator, and a dual-lumen catheter was advanced over the wire into the renal pelvis. An antegrade nephrostogram was performed to delineate the collecting system anatomy. We then advanced the [facility] [employer] balloon dilator. The radiopaque tip was advanced into the renal pelvis. The balloon was inflated to 16 cm of water, and the sheath was carefully advanced over the balloon into the renal pelvis. Once this was done, the nephroscope had been assembled. The [employer] ultrasonic lithotripter had been tested and was working well. It should be noted before we even started the procedure and removed the nephroureteral catheter, there was a slightly more venous oozing than it was normal.    I then began to fragment the stones and break them up using the lithotripter.   The stones were very hard. They were oval and somewhat difficult to pin down throughout the procedure. He had approximately six about 1.5-cm stones in the renal pelvis. We carefully and diligently removed all the stone fragments from the renal pelvis. I then used a flexible cystoscope to advance it down into the UPJ. Several stones were removed with a basket through the cystoscope. I then advanced the flexible cystoscope into the upper pole calyx. No other stones were seen and the interpolar calyx was also examined and again, no stones seen. Another antegrade nephrostogram was performed, which showed contrast flow in the upper pole collecting system as well as the renal pelvis. No extravasation of contrast was noted. At this point, I attempted to pass a flexible ureteroscope alongside the wide; however, there remained some resistance at the UPJ. I then decided to advance the flexible ureteroscope over the wire into the proximal ureter. We then examined the entire length of the ureter down into the bladder. No stones were seen in the mid and distal ureter. There were a couple of stone fragments that were adherent to the blood clot in the proximal ureter. We carefully basketed all these fragments using a ZeroTip basket. Another fluoroscopic image was taken, and I did not see any other radiopaque stones. I then replaced a sensor wire down into the bladder. A 5-Georgian catheter was then advanced over the wire into the distal ureter. A 22-[place]-tip catheter was advanced into the renal pelvis under fluoroscopy. The balloon was filled with 5 mL of sterile water. Prior to this, I did do another antegrade nephrostogram at the UPJ to ensure there was no extravasation and there was none. At this point, we then sutured the nephrostomy tube to the skin using an 0 silk suture. Sterile 4 x 4s, ABD pads, and large OpSite dressings were applied to the nephrostomy site.   There was some slight oozing at the incision site, and another suture was placed to close the incision to minimize any drainage from the nephrostomy site. At this point, the procedure was completed. The patient tolerated the procedure well. There were no immediate complications. He was transferred to the recovery room in stable condition. PLAN:  He will be admitted overnight for observation and we will plan to remove the Hare catheter in the morning. Labs will be checked in the morning as well as a KUB to ensure there are no residual stone fragments.   Given that there was some venous oozing noted at the beginning and throughout the procedure that was slightly more than normal, we plan to keep the nephrostomy tube and the 5-Hebrew catheter for a few days and I will have him follow up in the office later this week for removal.        MD LEICEO Ross III/[email or web address]_I/[email or web address]_P  D:  10/08/2019 22:37  T:  10/09/2019 4:02  JOB #:  [id number]

## 2019-10-09 NOTE — MANAGEMENT PLAN
Discharge/Transition Planning    Problem: Discharge Planning  Goal: *Discharge to safe environment  Outcome: Resolved/Met   Home        Reason for Admission:   Kidney Stones                   RRAT Score:          8           Plan for utilizing home health:    n/a                      Current Advanced Directive/Advance Care Plan: none                         Transition of Care Plan:          Interviewed patient. Verified demographics listed on face sheet with patient; all information correct. Pt with [employer] Medicaid and  [employer]. Patient stated their PCP is Dr Cash Huerta in [facility] and goes every 6 mo . Patient lives in single family with wife . Patient independent with ADLs prior to admission. No use of DME prior to admission. Discharge plan is    Home      Patient has designated ____wife____________________ to participate in his/her discharge plan and to receive any needed information. Katelynn Manohar Spouse [phone]     Care Management Interventions  PCP Verified by CM: Yes(Dr Cely Mohan in [facility])  Last Visit to PCP: 04/09/19  Mode of Transport at Discharge:  Other (see comment)(wife)  Transition of Care Consult (CM Consult): Discharge Planning  Current Support Network: Lives with Spouse, Own Home  Confirm Follow Up Transport: Self  Plan discussed with Pt/Family/Caregiver: Yes  Discharge Location  Discharge Placement: Home

## 2019-10-09 NOTE — PROGRESS NOTES
Progress Note      Patient: Ángel Spangler MRN: [id number]  SSN: [id number]    YOB: 1945  Age: 69 y.o. Sex: male      Admit Date: 10/8/2019    LOS: 1 day     Assessment:   70 y/o male POD 1 from L PCNL, doing well. KUB shows no significant stone burden. Plan: We will remove the Tiger tail and perc tube prior to discharge. He will follow up in the office in approximately 4 weeks. Signed By: Umesh Luz MD     October 9, 2019      PAGER:  [phone]  OFFICE:  [address]   [phone]    Subjective:     Patient seen and examined earlier this morning. He is feeling well, no fevers or chills, some soreness in the left flank but manageable. Tolerating a diet. Up out of bed.     Objective:     Vitals:    10/08/19 2010 10/09/19 0354 10/09/19 0853 10/09/19 1258   BP: 127/74 (!) 138/97 126/68 134/76   Pulse: (!) 106 (!) 108 97 95   Resp: 16 16 16 16   Temp: 97.9 °F (36.6 °C) 97.8 °F (36.6 °C) 98.3 °F (36.8 °C) 98.1 °F (36.7 °C)   SpO2: 94% 99% 95% 99%   Weight:       Height:            Intake and Output:  Current Shift: 10/09 0701 - 10/09 1900  In: 120 [P.O.:120]  Out: 2250 [Urine:2250]  Last three shifts: 10/07 1901 - 10/09 0700  In: 1503.3 [P.O.:470; I.V.:1033.3]  Out: 4875 [Urine:4825]    Current Facility-Administered Medications   Medication Dose Route Frequency    rosuvastatin (CRESTOR) tablet 10 mg  10 mg Oral QHS    oxybutynin (DITROPAN) tablet 5 mg  5 mg Oral TID PRN    oxyCODONE-acetaminophen (PERCOCET) 5-325 mg per tablet 1 Tab  1 Tab Oral Q4H PRN    acetaminophen (TYLENOL) tablet 325 mg  325 mg Oral Q4H PRN    morphine injection 2 mg  2 mg IntraVENous Q4H PRN    0.9% sodium chloride infusion  50 mL/hr IntraVENous CONTINUOUS    diphenhydrAMINE (BENADRYL) capsule 25 mg  25 mg Oral Q6H PRN    labetalol (NORMODYNE;TRANDATE) 20 mg/4 mL (5 mg/mL) injection 20 mg  20 mg IntraVENous Q6H PRN    naloxone (NARCAN) injection 0.4 mg  0.4 mg IntraVENous PRN  ondansetron (ZOFRAN) injection 4 mg  4 mg IntraVENous Q6H    lisinopril (PRINIVIL, ZESTRIL) tablet 20 mg  20 mg Oral DAILY    Or    hydroCHLOROthiazide (HYDRODIURIL) tablet 12.5 mg  12.5 mg Oral DAILY         Physical Exam:   GENERAL: alert, cooperative, no distress, appears stated age  LUNG: unlabored breathing  ABDOMEN: soft, non-tender. No masses,  no organomegaly  EXTREMITIES:  extremities normal, atraumatic, no cyanosis or edema  SKIN: no jaundice  : no CVA tenderness. Left nephrostomy with tiger tail in place draining pink tinged urine. Lab/Data Review:  BMP:   Lab Results   Component Value Date/Time     10/09/2019 04:10 AM    K 4.3 10/09/2019 04:10 AM     10/09/2019 04:10 AM    CO2 25 10/09/2019 04:10 AM    AGAP 8 10/09/2019 04:10 AM    GLU 94 10/09/2019 04:10 AM    BUN 26 (H) 10/09/2019 04:10 AM    CREA 1.64 (H) 10/09/2019 04:10 AM    GFRAA 50 (L) 10/09/2019 04:10 AM    GFRNA 41 (L) 10/09/2019 04:10 AM     CBC:   Lab Results   Component Value Date/Time    WBC 15.5 (H) 10/09/2019 04:10 AM    HGB 11.8 (L) 10/09/2019 04:10 AM    HCT 35.7 (L) 10/09/2019 04:10 AM     10/09/2019 04:10 AM     COAGS: No results found for: APTT, PTP, INR, INREXT, INREXT       CT Results:  Results from Abstract encounter on 05/17/18   CT ABD PELV W CONT    Impression CT ABDOMEN PELVIS W IV CONTRAST5/4/2018  [employer]  Result Impression  IMPRESSION:  1. Moderate left-sided hydronephrosis and delayed enhancement of left   kidney. There is punctate left-sided nephrolithiasis with multiple   calcifications in the dependent portion of dilated left renal pelvis. Additionally, calcifications are noted in the mid left ureter and at the   left ureterovesical junction as above. There is prostate enlargement.           Thank you for this referral.         Reading Doctor: Deisi Hooks Signature by: Faye Arellano  Result Narrative  HISTORY: Left lower quadrant pain    TECHNIQUE: Helical 4.58 mm axial reconstructions were generated through the   abdomen and pelvis after the uneventful administration of 100cc Visipaque   320 intravenous contrast. Oral contrast was also administered. Multiplanar   reformatted images are generated. FINDINGS: The lung bases are clear. There is hepatic steatosis without   focal hepatic mass. Gallbladder, spleen, pancreas, both adrenal glands, and   right kidney are unremarkable. There is punctate left-sided   nephrolithiasis. There are dependent calcifications within dilated left   renal pelvis and there is left-sided hydronephrosis. A 3 mm calcification   is noted in the left ureter at the approximate L3 level and at the left   ureterovesical junction, a column of tiny calcifications largest measuring   approximately 3 mm is noted. The prostate is enlarged. No free fluid or free air. No adenopathy    ~~This report was copied and pasted from the [employer] system. ~~         US Results:  No results found for this or any previous visit.                               Active Problems:    Left nephrolithiasis (10/8/2019)

## 2019-10-09 NOTE — PROGRESS NOTES
Problem: Falls - Risk of  Goal: *Absence of Falls  Description  Document Basim Concepcion Fall Risk and appropriate interventions in the flowsheet. Outcome: Progressing Towards Goal  Note:   Fall Risk Interventions:  Mobility Interventions: Patient to call before getting OOB         Medication Interventions: Teach patient to arise slowly, Patient to call before getting OOB    Elimination Interventions: Call light in reach, Elevated toilet seat              Problem: Patient Education: Go to Patient Education Activity  Goal: Patient/Family Education  Outcome: Progressing Towards Goal     Problem: Pressure Injury - Risk of  Goal: *Prevention of pressure injury  Description  Document José Miguel Scale and appropriate interventions in the flowsheet.   Outcome: Progressing Towards Goal  Note:   Pressure Injury Interventions:  Sensory Interventions: Assess changes in LOC, Keep linens dry and wrinkle-free, Maintain/enhance activity level         Activity Interventions: Pressure redistribution bed/mattress(bed type), Increase time out of bed    Mobility Interventions: HOB 30 degrees or less, Pressure redistribution bed/mattress (bed type)    Nutrition Interventions: Document food/fluid/supplement intake                     Problem: Patient Education: Go to Patient Education Activity  Goal: Patient/Family Education  Outcome: Progressing Towards Goal     Problem: Nephrostomy: Day of Surgery  Goal: Activity/Safety  Outcome: Progressing Towards Goal  Goal: Diagnostic Test/Procedures  Outcome: Progressing Towards Goal  Goal: Nutrition/Diet  Outcome: Progressing Towards Goal  Goal: Discharge Planning  Outcome: Progressing Towards Goal  Goal: Medications  Outcome: Progressing Towards Goal  Goal: Respiratory  Outcome: Progressing Towards Goal  Goal: Treatments/Interventions/Procedures  Outcome: Progressing Towards Goal  Goal: Psychosocial  Outcome: Progressing Towards Goal  Goal: *No signs and symptoms of infection or wound complications  Outcome: Progressing Towards Goal  Goal: *Optimal pain control at patient's stated goal  Outcome: Progressing Towards Goal  Goal: *Adequate urinary output (equal to or greater than 30 milliliters/hour)  Outcome: Progressing Towards Goal  Goal: *Hemodynamically stable  Outcome: Progressing Towards Goal     Problem: Nephrostomy: Discharge Outcomes  Goal: *Hemodynamically stable  Outcome: Progressing Towards Goal  Goal: *Demonstrates independent activity or return to baseline  Outcome: Progressing Towards Goal  Goal: *Optimal pain control at patient's stated goal  Outcome: Progressing Towards Goal  Goal: *Verbalizes name, dosage, time, side effects, and number of days to continue medications  Outcome: Progressing Towards Goal  Goal: *No signs and symptoms of infection or wound complications  Outcome: Progressing Towards Goal  Goal: *Anxiety reduced or absent  Outcome: Progressing Towards Goal  Goal: *Understands and describes signs and symptoms to report to providers(Stroke Metric)  Outcome: Progressing Towards Goal  Goal: *Describes follow-up/return visits to physicians  Outcome: Progressing Towards Goal  Goal: *Describes available resources and support systems  Outcome: Progressing Towards Goal

## 2019-10-09 NOTE — PHYSICIAN ADVISORY
Letter of admission status determination     Clovia Drain   Age: 69 y.o. MRN: [id number]  Admitting physician:    Insurance: Payor: [facility] MEDICARE / Plan: [employer] / Product Type: Managed Care Medicare /     Clovia Drain was hospitalized for an elective surgical procedure authorized as outpatient. The patient has had an uneventful postoperative course. There were no unexpected events to justify Observation or Inpatient status. Therefore, our recommendation is to place Clovia Drain in Outpatient status as preauthorized by the payor. The final decision regarding the patient's hospitalization status depends on the attending physician's judgment.        Lexii Lindquist MD, RIC, [address] DE[facility]. [facility], [address] [place] [address]   [phone]

## 2019-10-09 NOTE — PROGRESS NOTES
conducted an initial consultation and Spiritual Assessment for Nicole Dowling, who is a 69 y. o.,male. Patients Primary Language is: [place]. According to the patients EMR Scientologist Affiliation is: Restorationist. The reason the Patient came to the hospital is:   Patient Active Problem List    Diagnosis Date Noted    Left nephrolithiasis 10/08/2019    Elevated PSA 06/07/2012        The  provided the following Interventions:  Initiated a relationship of care and support with patient and family  in room 2211 this morning. Listened as patient talked about his being here for the first time and is pleased with what he is feeling thus far. Patient expecting a short stay according to doctor report. Provided information about Spiritual Care Services. Offered prayer and assurance of continued prayers on patients behalf. The following outcomes were achieved:  Patient shared limited information about his medical narrative and spiritual journey/beliefs. Patient processed feeling about current hospitalization. Patient expressed gratitude for pastoral care visit. Assessment:  Patient does not have any Scientologist/cultural needs that will affect patients preferences in health care. There are no further spiritual or Scientologist issues which require Spiritual Care Services interventions at this time. Plan:  Chaplains will continue to follow and will provide pastoral care on an as needed/requested basis    . Seferino Rhodes   Spiritual Care   [phone]

## 2019-10-09 NOTE — PROGRESS NOTES
Problem: Falls - Risk of  Goal: *Absence of Falls  Description  Document Margareth Davila Fall Risk and appropriate interventions in the flowsheet. Outcome: Progressing Towards Goal  Note:   Fall Risk Interventions:  Mobility Interventions: Patient to call before getting OOB         Medication Interventions: Teach patient to arise slowly, Patient to call before getting OOB    Elimination Interventions: Call light in reach, Elevated toilet seat              Problem: Patient Education: Go to Patient Education Activity  Goal: Patient/Family Education  Outcome: Progressing Towards Goal     Problem: Pressure Injury - Risk of  Goal: *Prevention of pressure injury  Description  Document José Miguel Scale and appropriate interventions in the flowsheet.   Outcome: Progressing Towards Goal  Note:   Pressure Injury Interventions:  Sensory Interventions: Assess changes in LOC, Keep linens dry and wrinkle-free, Maintain/enhance activity level         Activity Interventions: Pressure redistribution bed/mattress(bed type), Increase time out of bed    Mobility Interventions: HOB 30 degrees or less, Pressure redistribution bed/mattress (bed type)    Nutrition Interventions: Document food/fluid/supplement intake                     Problem: Patient Education: Go to Patient Education Activity  Goal: Patient/Family Education  Outcome: Progressing Towards Goal     Problem: Nephrostomy: Day of Surgery  Goal: Activity/Safety  Outcome: Progressing Towards Goal  Goal: Diagnostic Test/Procedures  Outcome: Progressing Towards Goal  Goal: Nutrition/Diet  Outcome: Progressing Towards Goal  Goal: Discharge Planning  Outcome: Progressing Towards Goal  Goal: Medications  Outcome: Progressing Towards Goal  Goal: Respiratory  Outcome: Progressing Towards Goal  Goal: Treatments/Interventions/Procedures  Outcome: Progressing Towards Goal  Goal: Psychosocial  Outcome: Progressing Towards Goal  Goal: *No signs and symptoms of infection or wound complications  Outcome: Progressing Towards Goal  Goal: *Optimal pain control at patient's stated goal  Outcome: Progressing Towards Goal  Goal: *Adequate urinary output (equal to or greater than 30 milliliters/hour)  Outcome: Progressing Towards Goal  Goal: *Hemodynamically stable  Outcome: Progressing Towards Goal     Problem: Nephrostomy: Discharge Outcomes  Goal: *Hemodynamically stable  Outcome: Progressing Towards Goal  Goal: *Demonstrates independent activity or return to baseline  Outcome: Progressing Towards Goal  Goal: *Optimal pain control at patient's stated goal  Outcome: Progressing Towards Goal  Goal: *Verbalizes name, dosage, time, side effects, and number of days to continue medications  Outcome: Progressing Towards Goal  Goal: *No signs and symptoms of infection or wound complications  Outcome: Progressing Towards Goal  Goal: *Anxiety reduced or absent  Outcome: Progressing Towards Goal  Goal: *Understands and describes signs and symptoms to report to providers(Stroke Metric)  Outcome: Progressing Towards Goal  Goal: *Describes follow-up/return visits to physicians  Outcome: Progressing Towards Goal  Goal: *Describes available resources and support systems  Outcome: Progressing Towards Goal

## 2019-10-09 NOTE — PROGRESS NOTES
1915-Bedside and Verbal shift change report given to Duong García RN (oncoming nurse) by Ivana Reinoso RN (offgoing nurse). Report included the following information SBAR, Kardex, Procedure Summary, Intake/Output and MAR.     5158- Hare catheter removed per MD order. Pt educated on due to void time. 0715- Off floor for KUB.

## 2019-10-09 NOTE — PROGRESS NOTES
Bedside and Verbal shift change report given to Emily Cummins RN (oncoming nurse) by Marcelo Diggs RN (offgoing nurse). Report included the following information SBAR, Kardex, Procedure Summary, Intake/Output and MAR.

## 2019-10-09 NOTE — PROGRESS NOTES
6092 Bedside and Verbal shift change report given to Bisi Powers (oncoming nurse) by Brenda Espsoito (offgoing nurse). Report included the following information SBAR, Kardex, Intake/Output and MAR.     6548 Pt up ad chaz voided in restroom    [id number] [phone] Discharge education provided verbally and in writing. All questions answered. Pt verbalized understanding. Armbands removed and shredded.

## 2019-10-17 LAB
COLOR STONE: NORMAL
COMMENT, 120677: NORMAL
COMMENT, 519994: NORMAL
COMPOSITION, 120440: NORMAL
DISCLAIMER, STO32L: NORMAL
NIDUS STONE QL: NORMAL
SIZE STONE: NORMAL MM
URATE MFR STONE: 100 %
WT STONE: 6030.1 MG

## 2019-10-24 NOTE — DISCHARGE SUMMARY
Discharge Summary       Patient: Hilario Ramsey               Sex: male          DOA: 10/8/2019         YOB: 1945      Age:  69 y.o.        LOS:  LOS: 1 day     ACUTE DIAGNOSES:  Nephrolithiasis     PROCEDURE:  Percutaneous Nephrolithotomy     HOSPITAL COURSE:  Patient was admitted post uncomplicated PCNL. Prior to discharge patient was tolerating a diet, ambulating, voiding spontaneously with good pain control. Perc tube was removed prior to discharge after repeat CT abdomen/pelvis demonstrated resolution of stone burden. PHYSICAL EXAM:  General: AAox3 in  NAD  Abdomen: Soft/NT/ND  : Hare removed. Urine pink. Flank dressing clean dry and intact. Extremities: Warm and well perfused. PLAN:  Follow up in the office in approximately 4-6 weeks. CHRONIC MEDICAL DIAGNOSES:  Problem List as of 10/9/2019 Date Reviewed: 9/10/2019          Codes Class Noted - Resolved    Left nephrolithiasis ICD-10-CM: N20.0  ICD-9-CM: 592.0  10/8/2019 - Present        Elevated PSA ICD-10-CM: R97.20  ICD-9-CM: 790.93  6/7/2012 - Present              ACTIVITY: No heavy lifting for 1 week or until urine is clear, whichever is longer. ADDITIONAL INFORMATION:   You have indwelling ureteral stents which frequently causes slight discomfort in the flank region and bladder spasms. You can take flomax as needed for flank discomfort or Ditropan for bladder spasms. The indwelling stent will need to be removed/exchanged as directed below. If the stent is left in place without appropriate follow-up, it may become encrusted with stone and/or infected. If that occurs, you will require multiple additional surgeries to fix this. It is very important you follow up for appropriate removal or exchange of ureteral stents. If you experience any fevers > 100.4, significant nausea/vomiting, or significant worsening of pain, please contact us at the number below.   It is common to experience mild, recurrent blood in your urine and this is likely due to stent irritation. The general trend should be decreasing bleeding with occasional flares due to increased activity. If the bleeding continues to worsen with passage of clots, you are unable to urinate, or you experience significant light-headedness, please contact us at the number below. If you develop new bleeding after a period of clear urine and this is severe, particularly if this is around 10 days after surgery, please contact our office immediately. If the bleeding is severe with clots or you feel lightheaded, please proceed immediately to the closest Emergency department. You may resume showering but do not directly scrub the incision. You may resume bathing in two week. Please inspect your incision daily, looking for signs of infection (increasing/spreading redness, swelling, drainage). Keep the incision clean and dry. Should urine leakage persist beyond one week, please contact [facility].

## 2021-03-03 PROBLEM — M48.062 PSEUDOCLAUDICATION SYNDROME: Status: ACTIVE | Noted: 2020-12-23

## 2021-03-03 PROBLEM — N13.9 OBSTRUCTIVE UROPATHY: Status: ACTIVE | Noted: 2021-03-03

## 2021-03-03 PROBLEM — N17.9 AKI (ACUTE KIDNEY INJURY) (HCC): Status: ACTIVE | Noted: 2019-07-21

## 2021-03-03 PROBLEM — N18.30 STAGE 3 CHRONIC KIDNEY DISEASE (HCC): Status: ACTIVE | Noted: 2019-01-01

## 2021-03-03 PROBLEM — E78.00 HIGH BLOOD CHOLESTEROL: Status: ACTIVE | Noted: 2019-07-22

## 2021-03-03 PROBLEM — I44.7 LEFT BUNDLE BRANCH BLOCK: Status: ACTIVE | Noted: 2020-12-14

## (undated) DEVICE — 1010 S-DRAPE TOWEL DRAPE 10/BX: Brand: STERI-DRAPE™

## (undated) DEVICE — INTENDED FOR TISSUE SEPARATION, AND OTHER PROCEDURES THAT REQUIRE A SHARP SURGICAL BLADE TO PUNCTURE OR CUT.: Brand: BARD-PARKER ® CARBON RIB-BACK BLADES

## (undated) DEVICE — GDWIRE 3CM FLX-TIP 0.038X150CM -- BX/5 SENSOR

## (undated) DEVICE — GDWIRE URET STR STD .038X150 -- ZIPWIRE STD

## (undated) DEVICE — CATH URETH FOL 2W SH 20FRX5ML --

## (undated) DEVICE — 3M™ TEGADERM™ TRANSPARENT FILM DRESSING FRAME STYLE, 1628, 6 IN X 8 IN (15 CM X 20 CM), 10/CT 8CT/CASE: Brand: 3M™ TEGADERM™

## (undated) DEVICE — TRAY CATH 16F URIN MTR LTX -- CONVERT TO ITEM 363111

## (undated) DEVICE — BAG DRAIN URIN 2000ML LF STRL -- CONVERT TO ITEM 363123

## (undated) DEVICE — SYR IRR CATH TIP LR ADPT 70ML -- CONVERT TO ITEM 363120

## (undated) DEVICE — NEEDLE HYPO 25GA L1.5IN BVL ORIENTED ECLIPSE

## (undated) DEVICE — [FOUR SPIKE IRRIGATOR SET,  NON-PYROGENIC FLUID PATH,  DO NOT USE IF PACKAGE IS DAMAGED]

## (undated) DEVICE — Device

## (undated) DEVICE — PROBE LITHO RENAL BLDR DISP

## (undated) DEVICE — SYR 10ML CTRL LR LCK NSAF LF --

## (undated) DEVICE — OPEN-END FLEXI-TIP URETERAL CATHETER: Brand: FLEXI-TIP

## (undated) DEVICE — SOLUTION IV 1000ML 0.9% SOD CHL

## (undated) DEVICE — BLLN RMFG INF DEVICE ENC 26 --

## (undated) DEVICE — SNAP KOVER: Brand: UNBRANDED

## (undated) DEVICE — DUAL LUMEN URETERAL CATHETER

## (undated) DEVICE — GOWN,AURORA,FABRIC-REINFORCED,X-LARGE: Brand: MEDLINE

## (undated) DEVICE — GDWIREGLDEWIRE 0.038INX150CM --

## (undated) DEVICE — DEPAUL MAJOR PROCEDURE PACK: Brand: MEDLINE INDUSTRIES, INC.

## (undated) DEVICE — 20 ML SYRINGE LUER-LOCK TIP: Brand: MONOJECT

## (undated) DEVICE — TRAP SPEC COLL POLYP POLYSTYR --

## (undated) DEVICE — DRAPE PERC PROC W335XL196CM LINGEMAN

## (undated) DEVICE — MEDI-VAC NON-CONDUCTIVE SUCTION TUBING 6MM X 6.1M (20 FT.) L: Brand: CARDINAL HEALTH

## (undated) DEVICE — KENDALL SCD EXPRESS SLEEVES, KNEE LENGTH, MEDIUM: Brand: KENDALL SCD

## (undated) DEVICE — RADIFOCUS GLIDEWIRE: Brand: GLIDEWIRE

## (undated) DEVICE — SOLUTION IRRIG 3000ML 0.9% SOD CHL FLX CONT 0797208] ICU MEDICAL INC]

## (undated) DEVICE — DILATOR/SHEATH SET: Brand: 8/10 DILATOR/SHEATH SET

## (undated) DEVICE — NITINOL STONE RETRIEVAL BASKET: Brand: ZERO TIP

## (undated) DEVICE — HIGH PRESSURE NEPHROSTOMY BALLOON CATHETER: Brand: NEPHROMAX

## (undated) DEVICE — STERILE POLYISOPRENE POWDER-FREE SURGICAL GLOVES: Brand: PROTEXIS